# Patient Record
Sex: FEMALE | Race: WHITE | NOT HISPANIC OR LATINO | URBAN - METROPOLITAN AREA
[De-identification: names, ages, dates, MRNs, and addresses within clinical notes are randomized per-mention and may not be internally consistent; named-entity substitution may affect disease eponyms.]

---

## 2023-02-24 ENCOUNTER — OFFICE VISIT (OUTPATIENT)
Dept: URBAN - METROPOLITAN AREA CLINIC 60 | Facility: CLINIC | Age: 23
End: 2023-02-24

## 2023-02-24 RX ORDER — TRAMADOL HYDROCHLORIDE 50 MG/1
1 TABLET AS NEEDED TABLET, FILM COATED ORAL ONCE A DAY
Status: ACTIVE | COMMUNITY

## 2023-02-24 RX ORDER — ONDANSETRON HYDROCHLORIDE 4 MG/1
1 TABLET TABLET, FILM COATED ORAL ONCE A DAY
Status: ACTIVE | COMMUNITY

## 2023-03-08 ENCOUNTER — WEB ENCOUNTER (OUTPATIENT)
Dept: URBAN - METROPOLITAN AREA CLINIC 60 | Facility: CLINIC | Age: 23
End: 2023-03-08

## 2023-03-08 ENCOUNTER — OFFICE VISIT (OUTPATIENT)
Dept: URBAN - METROPOLITAN AREA CLINIC 60 | Facility: CLINIC | Age: 23
End: 2023-03-08
Payer: COMMERCIAL

## 2023-03-08 VITALS
SYSTOLIC BLOOD PRESSURE: 118 MMHG | TEMPERATURE: 98 F | HEIGHT: 62 IN | HEART RATE: 68 BPM | OXYGEN SATURATION: 96 % | DIASTOLIC BLOOD PRESSURE: 76 MMHG | WEIGHT: 145 LBS | BODY MASS INDEX: 26.68 KG/M2

## 2023-03-08 DIAGNOSIS — R11.0 NAUSEA: ICD-10-CM

## 2023-03-08 DIAGNOSIS — R10.9 ABDOMINAL CRAMPING: ICD-10-CM

## 2023-03-08 DIAGNOSIS — K92.1 HEMATOCHEZIA: ICD-10-CM

## 2023-03-08 DIAGNOSIS — R19.7 DIARRHEA, UNSPECIFIED TYPE: ICD-10-CM

## 2023-03-08 PROBLEM — 449341000124102: Status: ACTIVE | Noted: 2023-03-08

## 2023-03-08 PROBLEM — 247330004: Status: ACTIVE | Noted: 2023-03-08

## 2023-03-08 PROCEDURE — 99203 OFFICE O/P NEW LOW 30 MIN: CPT | Performed by: NURSE PRACTITIONER

## 2023-03-08 RX ORDER — FAMOTIDINE 20 MG/1
1 TABLET TABLET, FILM COATED ORAL TWICE A DAY
Qty: 60 TABLET | Refills: 0 | OUTPATIENT
Start: 2023-03-08

## 2023-03-08 NOTE — HPI-HPI
She is seen in the office today for a long history of fluctuating bowels, abdominal pain/cramping , occasional hematochezia and previous history of C. difficile. She first developed chronic cramping and diarrhea when she lived in [place] about 3 years ago and tested positive for C. difficile.  She was treated vancomycin and the symptoms resolved.  Later on she developed diarrhea again and tested positive for both C. difficile and E. coli.  She was treated again with antibiotics and symptoms decreased but never went away.  Later she again tested positive for C. difficile and reports she was given fecal transplant capsule while in [place].  She continued to have symptoms of fluctuating watery stools with abdominal cramping followed by several days of constipation.  She was also seen some blood mixed in the stool at times.  She was seen by gastroenterologist in [place] who was scheduling colonoscopy evaluation.  However, for colonoscopy to be completed she moved with her mother to [place].  Today she reports still having abdominal cramping every day which is usually in the lower abdomen.  She has diarrhea for a couple of days followed by constipation in which she may not go to the bathroom for 3 or 4 days.  Again she reports having some hematochezia off and on.  She has never had a colonoscopy.  She has been seen in the ER on several occasions with abdominal pain and CT scans have been negative x 3.   She also reports having indigestion with reflux and this causes her to have nausea with vomiting.  This does not occur daily but can happen as much as 1-3 times every couple of weeks. She has not taken any medications for the reflux.

## 2023-03-10 PROBLEM — 111359004 DIVERTICULITIS OF COLON: Status: ACTIVE | Noted: 2023-03-10

## 2023-03-10 PROBLEM — 698065002 ACID REFLUX: Status: ACTIVE | Noted: 2023-03-10

## 2023-03-15 ENCOUNTER — LAB OUTSIDE AN ENCOUNTER (OUTPATIENT)
Dept: URBAN - METROPOLITAN AREA CLINIC 60 | Facility: CLINIC | Age: 23
End: 2023-03-15

## 2023-04-11 ENCOUNTER — WEB ENCOUNTER (OUTPATIENT)
Dept: URBAN - METROPOLITAN AREA SURGERY CENTER 4 | Facility: SURGERY CENTER | Age: 23
End: 2023-04-11

## 2023-04-17 ENCOUNTER — CLAIMS CREATED FROM THE CLAIM WINDOW (OUTPATIENT)
Dept: URBAN - METROPOLITAN AREA SURGERY CENTER 4 | Facility: SURGERY CENTER | Age: 23
End: 2023-04-17
Payer: COMMERCIAL

## 2023-04-17 ENCOUNTER — CLAIMS CREATED FROM THE CLAIM WINDOW (OUTPATIENT)
Dept: URBAN - METROPOLITAN AREA CLINIC 4 | Facility: CLINIC | Age: 23
End: 2023-04-17
Payer: COMMERCIAL

## 2023-04-17 DIAGNOSIS — K31.89 OTHER DISEASES OF STOMACH AND DUODENUM: ICD-10-CM

## 2023-04-17 DIAGNOSIS — K29.70 GASTRITIS: ICD-10-CM

## 2023-04-17 DIAGNOSIS — R19.4 CHANGE IN BOWEL HABIT: ICD-10-CM

## 2023-04-17 DIAGNOSIS — K64.0 FIRST DEGREE HEMORRHOIDS: ICD-10-CM

## 2023-04-17 DIAGNOSIS — K30 DYSPEPSIA: ICD-10-CM

## 2023-04-17 PROCEDURE — 45380 COLONOSCOPY AND BIOPSY: CPT | Performed by: INTERNAL MEDICINE

## 2023-04-17 PROCEDURE — 88305 TISSUE EXAM BY PATHOLOGIST: CPT | Performed by: PATHOLOGY

## 2023-04-17 PROCEDURE — 43239 EGD BIOPSY SINGLE/MULTIPLE: CPT | Performed by: INTERNAL MEDICINE

## 2023-04-17 RX ORDER — FAMOTIDINE 20 MG/1
1 TABLET TABLET, FILM COATED ORAL TWICE A DAY
Qty: 60 TABLET | Refills: 0 | Status: ON HOLD | COMMUNITY
Start: 2023-03-08

## 2023-05-12 ENCOUNTER — DASHBOARD ENCOUNTERS (OUTPATIENT)
Age: 23
End: 2023-05-12

## 2023-05-12 ENCOUNTER — LAB OUTSIDE AN ENCOUNTER (OUTPATIENT)
Dept: URBAN - METROPOLITAN AREA CLINIC 60 | Facility: CLINIC | Age: 23
End: 2023-05-12

## 2023-05-12 ENCOUNTER — OFFICE VISIT (OUTPATIENT)
Dept: URBAN - METROPOLITAN AREA CLINIC 60 | Facility: CLINIC | Age: 23
End: 2023-05-12
Payer: COMMERCIAL

## 2023-05-12 VITALS
TEMPERATURE: 98.4 F | OXYGEN SATURATION: 98 % | DIASTOLIC BLOOD PRESSURE: 78 MMHG | RESPIRATION RATE: 20 BRPM | SYSTOLIC BLOOD PRESSURE: 110 MMHG | WEIGHT: 147 LBS | HEART RATE: 69 BPM | HEIGHT: 62 IN | BODY MASS INDEX: 27.05 KG/M2

## 2023-05-12 DIAGNOSIS — R10.84 GENERALIZED ABDOMINAL PAIN: ICD-10-CM

## 2023-05-12 DIAGNOSIS — K58.9 SPASM OF BOWEL: ICD-10-CM

## 2023-05-12 DIAGNOSIS — R14.0 BLOATING: ICD-10-CM

## 2023-05-12 PROBLEM — 249531009: Status: ACTIVE | Noted: 2023-05-12

## 2023-05-12 PROBLEM — 102614006: Status: ACTIVE | Noted: 2023-05-12

## 2023-05-12 PROCEDURE — 99213 OFFICE O/P EST LOW 20 MIN: CPT | Performed by: NURSE PRACTITIONER

## 2023-05-12 RX ORDER — DICYCLOMINE HYDROCHLORIDE 20 MG/1
1 TABLET TABLET ORAL
Qty: 60 TABLET | Refills: 1 | OUTPATIENT
Start: 2023-05-12 | End: 2023-07-11

## 2023-05-12 RX ORDER — FAMOTIDINE 20 MG/1
1 TABLET TABLET, FILM COATED ORAL TWICE A DAY
Qty: 60 TABLET | Refills: 0 | Status: ACTIVE | COMMUNITY
Start: 2023-03-08

## 2023-05-12 NOTE — HPI-TODAY'S VISIT:
She is seen in the follow-up after EGD/colonoscopy procedures.  EGD revealed normal-appearing esophagus.  Gastritis was observed in the antrum and biopsy revealed no significant histopathological abnormalities.  Duodenum appeared normal and biopsy was negative for celiac, duodenitis or intestinal organisms. Colonoscopy was completed which revealed normal-appearing large bowel mucosa throughout the entire colon.  Terminal ileum was cannulated and appeared normal.  Biopsy was taken which was normal with no significant abnormalities.  Random biopsies taken from sigmoid colon and rectum revealed no significant abnormalities negative for IBD or microscopic colitis.  Grade 1 internal hemorrhoids were observed.  She still is having Episodes of bloating,  lasting 2-3 days with generalized abdominal pain/cramping. NO fluctuations of bowels.  She completed Stool studies which were negative for inflammation or infection. Biopsy from duodenum negative for celiac. CT abdomen/pelvis completed February 7, 2023 was normal with no acute intra-abdominal or intrapelvic processes. She states not knowing any particular triggers for the symptoms. Symptoms have been chronic.

## 2023-06-14 ENCOUNTER — OFFICE VISIT (OUTPATIENT)
Dept: URBAN - METROPOLITAN AREA CLINIC 60 | Facility: CLINIC | Age: 23
End: 2023-06-14